# Patient Record
Sex: FEMALE | URBAN - METROPOLITAN AREA
[De-identification: names, ages, dates, MRNs, and addresses within clinical notes are randomized per-mention and may not be internally consistent; named-entity substitution may affect disease eponyms.]

---

## 2019-04-09 ENCOUNTER — APPOINTMENT (RX ONLY)
Dept: URBAN - METROPOLITAN AREA CLINIC 88 | Facility: CLINIC | Age: 29
Setting detail: DERMATOLOGY
End: 2019-04-09

## 2019-04-09 DIAGNOSIS — L40.0 PSORIASIS VULGARIS: ICD-10-CM

## 2019-04-09 DIAGNOSIS — L73.2 HIDRADENITIS SUPPURATIVA: ICD-10-CM

## 2019-04-09 PROCEDURE — ? TREATMENT REGIMEN

## 2019-04-09 PROCEDURE — ? ORDER TESTS

## 2019-04-09 PROCEDURE — 99202 OFFICE O/P NEW SF 15 MIN: CPT

## 2019-04-09 PROCEDURE — ? COUNSELING

## 2019-04-09 PROCEDURE — ? PRESCRIPTION

## 2019-04-09 RX ORDER — IXEKIZUMAB 80 MG/ML
INJECTION, SOLUTION SUBCUTANEOUS
Qty: 1 | Refills: 5 | Status: ERX

## 2019-04-09 ASSESSMENT — LOCATION ZONE DERM
LOCATION ZONE: AXILLAE
LOCATION ZONE: SCALP
LOCATION ZONE: ARM
LOCATION ZONE: LEG
LOCATION ZONE: EAR

## 2019-04-09 ASSESSMENT — LOCATION SIMPLE DESCRIPTION DERM
LOCATION SIMPLE: RIGHT EAR
LOCATION SIMPLE: LEFT AXILLARY VAULT
LOCATION SIMPLE: RIGHT PRETIBIAL REGION
LOCATION SIMPLE: POSTERIOR SCALP
LOCATION SIMPLE: LEFT EAR
LOCATION SIMPLE: RIGHT AXILLARY VAULT
LOCATION SIMPLE: LEFT ELBOW
LOCATION SIMPLE: RIGHT ELBOW
LOCATION SIMPLE: LEFT PRETIBIAL REGION

## 2019-04-09 ASSESSMENT — LOCATION DETAILED DESCRIPTION DERM
LOCATION DETAILED: RIGHT AXILLARY VAULT
LOCATION DETAILED: LEFT PROXIMAL PRETIBIAL REGION
LOCATION DETAILED: LEFT ELBOW
LOCATION DETAILED: POSTERIOR MID-PARIETAL SCALP
LOCATION DETAILED: RIGHT SCAPHA
LOCATION DETAILED: LEFT AXILLARY VAULT
LOCATION DETAILED: RIGHT ELBOW
LOCATION DETAILED: RIGHT PROXIMAL PRETIBIAL REGION
LOCATION DETAILED: LEFT SCAPHA

## 2019-04-09 NOTE — PROCEDURE: TREATMENT REGIMEN
Plan: Discussed and recommended a cx to r/o MRSA
Action 3: Continue
Continue Regimen: Clindamycin and Keflex that was given by urgent care
Detail Level: Zone
Continue Regimen: Lisette
Plan: Discussed and recommended pt see an immunologist to r/o any underlying diseases. Advised pt to start a healthier diet, add more green and leafy foods, avoid chicken if possible.

## 2019-05-28 ENCOUNTER — APPOINTMENT (RX ONLY)
Dept: URBAN - METROPOLITAN AREA CLINIC 88 | Facility: CLINIC | Age: 29
Setting detail: DERMATOLOGY
End: 2019-05-28

## 2019-05-28 DIAGNOSIS — L73.2 HIDRADENITIS SUPPURATIVA: ICD-10-CM | Status: STABLE

## 2019-05-28 DIAGNOSIS — L40.0 PSORIASIS VULGARIS: ICD-10-CM | Status: UNCHANGED

## 2019-05-28 PROBLEM — I10 ESSENTIAL (PRIMARY) HYPERTENSION: Status: ACTIVE | Noted: 2019-05-28

## 2019-05-28 PROCEDURE — ? TREATMENT REGIMEN

## 2019-05-28 PROCEDURE — 99213 OFFICE O/P EST LOW 20 MIN: CPT

## 2019-05-28 PROCEDURE — ? COUNSELING

## 2019-05-28 ASSESSMENT — LOCATION ZONE DERM
LOCATION ZONE: ARM
LOCATION ZONE: LEG
LOCATION ZONE: AXILLAE
LOCATION ZONE: SCALP
LOCATION ZONE: EAR

## 2019-05-28 ASSESSMENT — LOCATION DETAILED DESCRIPTION DERM
LOCATION DETAILED: LEFT PROXIMAL PRETIBIAL REGION
LOCATION DETAILED: LEFT AXILLARY VAULT
LOCATION DETAILED: RIGHT ELBOW
LOCATION DETAILED: RIGHT AXILLARY VAULT
LOCATION DETAILED: LEFT SCAPHA
LOCATION DETAILED: RIGHT SCAPHA
LOCATION DETAILED: POSTERIOR MID-PARIETAL SCALP
LOCATION DETAILED: LEFT ELBOW
LOCATION DETAILED: RIGHT PROXIMAL PRETIBIAL REGION

## 2019-05-28 ASSESSMENT — LOCATION SIMPLE DESCRIPTION DERM
LOCATION SIMPLE: LEFT PRETIBIAL REGION
LOCATION SIMPLE: RIGHT PRETIBIAL REGION
LOCATION SIMPLE: RIGHT EAR
LOCATION SIMPLE: LEFT AXILLARY VAULT
LOCATION SIMPLE: LEFT EAR
LOCATION SIMPLE: RIGHT ELBOW
LOCATION SIMPLE: RIGHT AXILLARY VAULT
LOCATION SIMPLE: LEFT ELBOW
LOCATION SIMPLE: POSTERIOR SCALP

## 2019-05-28 NOTE — PROCEDURE: TREATMENT REGIMEN
Plan: Discussed and recommended pt see an immunologist to r/o any underlying diseases. Advised pt to start a healthier diet, add more green and leafy foods, avoid chicken if possible.
Action 2: Continue
Detail Level: Zone
Continue Regimen: Taltz, we will consider Tremfya due to patients flare up, forms were completed as faxed to [facility]

## 2019-06-18 ENCOUNTER — RX ONLY (OUTPATIENT)
Age: 29
Setting detail: RX ONLY
End: 2019-06-18

## 2019-06-18 RX ORDER — CLOBETASOL PROPIONATE 0.5 MG/G
CREAM TOPICAL
Qty: 1 | Refills: 0 | Status: ERX | COMMUNITY
Start: 2019-06-18

## 2019-08-20 ENCOUNTER — APPOINTMENT (RX ONLY)
Dept: URBAN - METROPOLITAN AREA CLINIC 88 | Facility: CLINIC | Age: 29
Setting detail: DERMATOLOGY
End: 2019-08-20

## 2019-08-20 DIAGNOSIS — L40.0 PSORIASIS VULGARIS: ICD-10-CM

## 2019-08-20 DIAGNOSIS — D18.0 HEMANGIOMA: ICD-10-CM

## 2019-08-20 DIAGNOSIS — L73.2 HIDRADENITIS SUPPURATIVA: ICD-10-CM

## 2019-08-20 PROBLEM — D18.01 HEMANGIOMA OF SKIN AND SUBCUTANEOUS TISSUE: Status: ACTIVE | Noted: 2019-08-20

## 2019-08-20 PROCEDURE — ? PRESCRIPTION

## 2019-08-20 PROCEDURE — ? TREATMENT REGIMEN

## 2019-08-20 PROCEDURE — 99214 OFFICE O/P EST MOD 30 MIN: CPT

## 2019-08-20 PROCEDURE — ? COUNSELING

## 2019-08-20 RX ORDER — BETAMETHASONE DIPROPIONATE 0.5 MG/G
OINTMENT TOPICAL BID
Qty: 1 | Refills: 0 | Status: ERX | COMMUNITY
Start: 2019-08-20

## 2019-08-20 RX ORDER — SODIUM SULFACETAMIDE AND SULFUR 80; 40 MG/ML; MG/ML
SOLUTION TOPICAL QD
Qty: 1 | Refills: 3 | Status: ERX | COMMUNITY
Start: 2019-08-20

## 2019-08-20 RX ADMIN — SODIUM SULFACETAMIDE AND SULFUR: 80; 40 SOLUTION TOPICAL at 18:35

## 2019-08-20 RX ADMIN — BETAMETHASONE DIPROPIONATE: 0.5 OINTMENT TOPICAL at 18:32

## 2019-08-20 ASSESSMENT — LOCATION SIMPLE DESCRIPTION DERM
LOCATION SIMPLE: RIGHT POSTERIOR THIGH
LOCATION SIMPLE: RIGHT UPPER BACK
LOCATION SIMPLE: LEFT CALF
LOCATION SIMPLE: LEFT POSTERIOR THIGH
LOCATION SIMPLE: RIGHT PRETIBIAL REGION
LOCATION SIMPLE: LEFT AXILLARY VAULT
LOCATION SIMPLE: RIGHT SHOULDER
LOCATION SIMPLE: RIGHT CALF
LOCATION SIMPLE: LEFT PRETIBIAL REGION
LOCATION SIMPLE: LEFT FOREARM
LOCATION SIMPLE: RIGHT AXILLARY VAULT
LOCATION SIMPLE: RIGHT ELBOW
LOCATION SIMPLE: LEFT BREAST

## 2019-08-20 ASSESSMENT — LOCATION DETAILED DESCRIPTION DERM
LOCATION DETAILED: LEFT DISTAL POSTERIOR THIGH
LOCATION DETAILED: LEFT PROXIMAL CALF
LOCATION DETAILED: LEFT MEDIAL BREAST 9-10:00 REGION
LOCATION DETAILED: LEFT PROXIMAL PRETIBIAL REGION
LOCATION DETAILED: RIGHT PROXIMAL PRETIBIAL REGION
LOCATION DETAILED: RIGHT ANTERIOR SHOULDER
LOCATION DETAILED: RIGHT ELBOW
LOCATION DETAILED: RIGHT MEDIAL UPPER BACK
LOCATION DETAILED: LEFT PROXIMAL DORSAL FOREARM
LOCATION DETAILED: LEFT AXILLARY VAULT
LOCATION DETAILED: RIGHT PROXIMAL CALF
LOCATION DETAILED: RIGHT DISTAL LATERAL POSTERIOR THIGH
LOCATION DETAILED: RIGHT AXILLARY VAULT

## 2019-08-20 ASSESSMENT — LOCATION ZONE DERM
LOCATION ZONE: LEG
LOCATION ZONE: TRUNK
LOCATION ZONE: AXILLAE
LOCATION ZONE: ARM

## 2019-08-20 NOTE — PROCEDURE: TREATMENT REGIMEN
Action 3: Continue
Plan: Since pt reports Taltz is the only biologic that she can tolerate, recommend trying Taltz again. Patient is to use Betamethasone in the mean time. Pt also reports she has psoriatic arthritis. Recommended consult with rheumatologist, but pt reports she cannot find one that will take her insurance.
Detail Level: Zone

## 2019-09-12 ENCOUNTER — APPOINTMENT (RX ONLY)
Dept: URBAN - METROPOLITAN AREA CLINIC 88 | Facility: CLINIC | Age: 29
Setting detail: DERMATOLOGY
End: 2019-09-12

## 2019-09-12 DIAGNOSIS — L40.0 PSORIASIS VULGARIS: ICD-10-CM

## 2019-09-12 PROCEDURE — ? ORDER TESTS

## 2019-09-12 ASSESSMENT — PGA PSORIASIS: PGA PSORIASIS: MODERATE (MODERATE PLAQUE ELEVATION, MODERATE ERYTHEMA, COARSE SCALE PREDOMINATES)

## 2019-09-24 ENCOUNTER — APPOINTMENT (RX ONLY)
Dept: URBAN - METROPOLITAN AREA CLINIC 88 | Facility: CLINIC | Age: 29
Setting detail: DERMATOLOGY
End: 2019-09-24

## 2019-09-24 DIAGNOSIS — L40.0 PSORIASIS VULGARIS: ICD-10-CM

## 2019-09-24 PROCEDURE — ? TALTZ INITIATION

## 2019-09-24 PROCEDURE — 99213 OFFICE O/P EST LOW 20 MIN: CPT

## 2019-09-24 PROCEDURE — ? PRESCRIPTION

## 2019-09-24 RX ORDER — IXEKIZUMAB 80 MG/ML
INJECTION, SOLUTION SUBCUTANEOUS
Qty: 2 | Refills: 2 | Status: ERX

## 2019-09-24 NOTE — PROCEDURE: TALTZ INITIATION
Pregnancy And Lactation Warning Text: The risk during pregnancy and breastfeeding is uncertain with this medication.
Is Methotrexate Contraindicated?: No
Taltz Monitoring Guidelines: A yearly test for tuberculosis is required while taking Taltz.
Diagnosis (Required): Psoriasis
Taltz Dosing: 160mg SC x 1 at weeks 0 then 80mg SC at weeks 2, 4, 6, 8, 10 and 12 then 80mg SC every four weeks
Detail Level: Zone

## 2019-12-06 ENCOUNTER — APPOINTMENT (RX ONLY)
Dept: URBAN - METROPOLITAN AREA CLINIC 88 | Facility: CLINIC | Age: 29
Setting detail: DERMATOLOGY
End: 2019-12-06

## 2019-12-06 DIAGNOSIS — L40.0 PSORIASIS VULGARIS: ICD-10-CM

## 2019-12-06 PROCEDURE — 99214 OFFICE O/P EST MOD 30 MIN: CPT

## 2019-12-06 PROCEDURE — ? PRESCRIPTION

## 2019-12-06 PROCEDURE — ? ADDITIONAL NOTES

## 2019-12-06 RX ORDER — IXEKIZUMAB 80 MG/ML
INJECTION, SOLUTION SUBCUTANEOUS
Qty: 2 | Refills: 2 | Status: CANCELLED

## 2019-12-06 NOTE — PROCEDURE: ADDITIONAL NOTES
Detail Level: Simple
Additional Notes: Since patient continues to flare up on maintainance dose of taltz, rec to continue taltz injections q2 weeks.\\nWill start new PA.

## 2020-01-27 RX ORDER — IXEKIZUMAB 80 MG/ML
INJECTION, SOLUTION SUBCUTANEOUS
Qty: 2 | Refills: 2 | Status: ERX

## 2020-02-04 ENCOUNTER — APPOINTMENT (RX ONLY)
Dept: URBAN - METROPOLITAN AREA CLINIC 88 | Facility: CLINIC | Age: 30
Setting detail: DERMATOLOGY
End: 2020-02-04

## 2020-02-04 DIAGNOSIS — L73.2 HIDRADENITIS SUPPURATIVA: ICD-10-CM

## 2020-02-04 DIAGNOSIS — L40.0 PSORIASIS VULGARIS: ICD-10-CM

## 2020-02-04 PROCEDURE — ? ADDITIONAL NOTES

## 2020-02-04 PROCEDURE — ? PRESCRIPTION

## 2020-02-04 PROCEDURE — ? COUNSELING

## 2020-02-04 PROCEDURE — 99214 OFFICE O/P EST MOD 30 MIN: CPT

## 2020-02-04 ASSESSMENT — LOCATION SIMPLE DESCRIPTION DERM
LOCATION SIMPLE: RIGHT AXILLARY VAULT
LOCATION SIMPLE: LEFT AXILLARY VAULT
LOCATION SIMPLE: LEFT THIGH
LOCATION SIMPLE: RIGHT THIGH

## 2020-02-04 ASSESSMENT — LOCATION DETAILED DESCRIPTION DERM
LOCATION DETAILED: RIGHT AXILLARY VAULT
LOCATION DETAILED: RIGHT ANTERIOR PROXIMAL THIGH
LOCATION DETAILED: LEFT AXILLARY VAULT
LOCATION DETAILED: LEFT ANTERIOR PROXIMAL THIGH

## 2020-02-04 ASSESSMENT — LOCATION ZONE DERM
LOCATION ZONE: LEG
LOCATION ZONE: AXILLAE

## 2020-03-03 ENCOUNTER — APPOINTMENT (RX ONLY)
Dept: URBAN - METROPOLITAN AREA CLINIC 88 | Facility: CLINIC | Age: 30
Setting detail: DERMATOLOGY
End: 2020-03-03

## 2020-03-03 DIAGNOSIS — L73.2 HIDRADENITIS SUPPURATIVA: ICD-10-CM | Status: INADEQUATELY CONTROLLED

## 2020-03-03 DIAGNOSIS — L40.59 OTHER PSORIATIC ARTHROPATHY: ICD-10-CM | Status: INADEQUATELY CONTROLLED

## 2020-03-03 DIAGNOSIS — L40.0 PSORIASIS VULGARIS: ICD-10-CM | Status: INADEQUATELY CONTROLLED

## 2020-03-03 PROCEDURE — 99214 OFFICE O/P EST MOD 30 MIN: CPT

## 2020-03-03 PROCEDURE — ? COUNSELING

## 2020-03-03 PROCEDURE — ? HUMIRA INITIATION

## 2020-03-03 ASSESSMENT — LOCATION SIMPLE DESCRIPTION DERM
LOCATION SIMPLE: RIGHT THIGH
LOCATION SIMPLE: LEFT AXILLARY VAULT
LOCATION SIMPLE: LEFT THIGH
LOCATION SIMPLE: RIGHT AXILLARY VAULT

## 2020-03-03 ASSESSMENT — LOCATION ZONE DERM
LOCATION ZONE: RIB
LOCATION ZONE: LEG
LOCATION ZONE: AXILLAE

## 2020-03-03 ASSESSMENT — LOCATION DETAILED DESCRIPTION DERM
LOCATION DETAILED: LEFT AXILLARY VAULT
LOCATION DETAILED: LEFT ANTERIOR PROXIMAL THIGH
LOCATION DETAILED: RIGHT ANTERIOR PROXIMAL THIGH
LOCATION DETAILED: RIGHT AXILLARY VAULT

## 2020-03-03 NOTE — PROCEDURE: HUMIRA INITIATION
Humira Dosing: 160 mg SC day 1, 80 mg SC day 14, then 40 mg SC every other week starting on day 29
Is Cyclosporine Contraindicated?: No
Detail Level: Zone
Diagnosis (Required): Hidradenitis Suppurativa
Humira Monitoring Guidelines: A yearly test for tuberculosis is required while taking Humira.
Pregnancy And Lactation Warning Text: This medication is Pregnancy Category B and is considered safe during pregnancy. It is unknown if this medication is excreted in breast milk.

## 2020-04-02 ENCOUNTER — RX ONLY (OUTPATIENT)
Age: 30
Setting detail: RX ONLY
End: 2020-04-02

## 2020-04-02 RX ORDER — CLOBETASOL PROPIONATE 0.5 MG/G
OINTMENT TOPICAL
Qty: 1 | Refills: 1 | Status: ERX | COMMUNITY
Start: 2020-04-02

## 2020-04-03 ENCOUNTER — RX ONLY (OUTPATIENT)
Age: 30
Setting detail: RX ONLY
End: 2020-04-03

## 2020-04-03 RX ORDER — CLOBETASOL PROPIONATE 0.5 MG/G
OINTMENT TOPICAL
Qty: 1 | Refills: 1 | Status: ERX

## 2020-04-14 ENCOUNTER — APPOINTMENT (RX ONLY)
Dept: URBAN - METROPOLITAN AREA CLINIC 88 | Facility: CLINIC | Age: 30
Setting detail: DERMATOLOGY
End: 2020-04-14

## 2020-04-14 DIAGNOSIS — L40.0 PSORIASIS VULGARIS: ICD-10-CM

## 2020-04-14 DIAGNOSIS — L73.2 HIDRADENITIS SUPPURATIVA: ICD-10-CM

## 2020-04-14 PROCEDURE — ? COUNSELING

## 2020-04-14 PROCEDURE — ? ADDITIONAL NOTES

## 2020-04-14 PROCEDURE — ? HUMIRA INITIATION

## 2020-04-14 PROCEDURE — 99214 OFFICE O/P EST MOD 30 MIN: CPT | Mod: 95

## 2020-04-14 PROCEDURE — ? PRESCRIPTION

## 2020-04-14 NOTE — PROCEDURE: ADDITIONAL NOTES
Additional Notes: Last PPD 9/2019\\nHumira approved; Patient to fu with pharmacy to schedule delivery. \\nAdvised against loading dose due to COVID 19
Detail Level: Simple

## 2020-04-14 NOTE — PROCEDURE: HUMIRA INITIATION
Is Hydroxychloroquine Contraindicated?: No
Diagnosis (Required): Hidradenitis Suppurativa
Humira Monitoring Guidelines: A yearly test for tuberculosis is required while taking Humira.
Humira Dosing: 160 mg SC day 1, 80 mg SC day 14, then 40 mg SC every other week starting on day 29
Pregnancy And Lactation Warning Text: This medication is Pregnancy Category B and is considered safe during pregnancy. It is unknown if this medication is excreted in breast milk.
Detail Level: Zone

## 2020-05-12 ENCOUNTER — APPOINTMENT (RX ONLY)
Dept: URBAN - METROPOLITAN AREA CLINIC 88 | Facility: CLINIC | Age: 30
Setting detail: DERMATOLOGY
End: 2020-05-12

## 2020-05-12 DIAGNOSIS — L73.2 HIDRADENITIS SUPPURATIVA: ICD-10-CM

## 2020-05-12 DIAGNOSIS — L40.0 PSORIASIS VULGARIS: ICD-10-CM

## 2020-05-12 PROCEDURE — ? COUNSELING

## 2020-05-12 PROCEDURE — ? ADDITIONAL NOTES

## 2020-05-12 PROCEDURE — 99214 OFFICE O/P EST MOD 30 MIN: CPT | Mod: 95

## 2020-05-12 PROCEDURE — ? DEFER

## 2020-05-12 PROCEDURE — ? HUMIRA MONITORING

## 2020-05-12 NOTE — PROCEDURE: HUMIRA MONITORING
Comments: Called pharmacy today to see update and stated that they have to call us back to see why insurance denied humira. Pt is flaring up today however defer ILK treatment
Detail Level: Zone
Add High Risk Medication Management Associated Diagnosis?: No
Length Of Therapy: 4 months

## 2020-05-12 NOTE — PROCEDURE: ADDITIONAL NOTES
Detail Level: Simple
Additional Notes: Last PPD 9/2019\\nHumira approved; Patient to fu with pharmacy to schedule delivery. \\nAdvised against loading dose due to COVID 19

## 2020-05-12 NOTE — PROCEDURE: DEFER
Detail Level: Zone
Procedure To Be Performed At Next Visit: Intralesional Kenalog
Introduction Text (Please End With A Colon): The following procedure was deferred:

## 2020-05-29 ENCOUNTER — APPOINTMENT (RX ONLY)
Dept: URBAN - METROPOLITAN AREA CLINIC 88 | Facility: CLINIC | Age: 30
Setting detail: DERMATOLOGY
End: 2020-05-29

## 2020-05-29 DIAGNOSIS — L40.0 PSORIASIS VULGARIS: ICD-10-CM

## 2020-05-29 DIAGNOSIS — L73.2 HIDRADENITIS SUPPURATIVA: ICD-10-CM

## 2020-05-29 PROBLEM — L85.3 XEROSIS CUTIS: Status: ACTIVE | Noted: 2020-05-29

## 2020-05-29 PROCEDURE — ? COUNSELING

## 2020-05-29 PROCEDURE — ? INJECTION

## 2020-05-29 PROCEDURE — ? ADDITIONAL NOTES

## 2020-05-29 PROCEDURE — ? HUMIRA MONITORING

## 2020-05-29 PROCEDURE — 99213 OFFICE O/P EST LOW 20 MIN: CPT | Mod: 25

## 2020-05-29 PROCEDURE — 96372 THER/PROPH/DIAG INJ SC/IM: CPT

## 2020-05-29 PROCEDURE — ? TREATMENT REGIMEN

## 2020-05-29 ASSESSMENT — LOCATION DETAILED DESCRIPTION DERM: LOCATION DETAILED: RIGHT BUTTOCK

## 2020-05-29 ASSESSMENT — LOCATION SIMPLE DESCRIPTION DERM: LOCATION SIMPLE: RIGHT BUTTOCK

## 2020-05-29 ASSESSMENT — LOCATION ZONE DERM: LOCATION ZONE: TRUNK

## 2020-05-29 NOTE — PROCEDURE: INJECTION
Medication (1) And Associated J-Code Units: Triamcinolone acetonide, 10mg
Route: IM
Units: mg
Hide Second Medication?: No
Post-Care Instructions: I reviewed with the patient in detail post-care instructions. Patient understands to keep the injection sites clean and call the clinic if there is any redness, swelling or pain.
Bill J-Code: yes
Consent: The risks of the medication were reviewed with the patient.
Dose Administered (Numbers Only - Mg, G, Mcg, Units, Cc): 40
Treatment Number: 1
Dose Administered (Numbers Only - Mg, G, Mcg, Units, Cc): 0
Procedure Information: Please note that the numeric value listed in the Medication (1) and associated J-code units and Medication (2) and associated J-code units variables are j-code amounts and do not represent either the concentration or the total amount of the medications injected.  I strongly recommend selecting no to the Render J-code information in note question. This will allow your note to be more clear. If you are billing j-codes with your injection codes you need to document the total amount of the medication injected. This amount should match the j-code units. For example, if you are injecting Triamcinolone 40mg as an intramuscular injection you would select 40 for the dose field and mg for the units. This would allow you to document  with 4 units (40mg = 10mg x 4). The total volume is not used to calculate j-codes only the amount of the medication administered.
Detail Level: None

## 2020-05-29 NOTE — PROCEDURE: ADDITIONAL NOTES
Detail Level: Simple
Additional Notes: Last PPD 9/2019\\nHumira approved; Patient to fu with pharmacy to schedule delivery. \\nAdvised against loading dose due to COVID 19\\n\\nInjected 40mg SQ Friday 5/24/2020

## 2020-05-29 NOTE — PROCEDURE: HUMIRA MONITORING
Patient Reported Weight(Optional But Include Units): 225
Comments: Called pharmacy today to see update and stated that they have to call us back to see why insurance denied humira. Pt is flaring up today however defer ILK treatment
Length Of Therapy: 4 months
Add High Risk Medication Management Associated Diagnosis?: No
Detail Level: Zone

## 2020-06-05 ENCOUNTER — RX ONLY (OUTPATIENT)
Age: 30
Setting detail: RX ONLY
End: 2020-06-05

## 2020-08-04 ENCOUNTER — APPOINTMENT (RX ONLY)
Dept: URBAN - METROPOLITAN AREA CLINIC 88 | Facility: CLINIC | Age: 30
Setting detail: DERMATOLOGY
End: 2020-08-04

## 2022-09-01 ENCOUNTER — HOSPITAL ENCOUNTER (OUTPATIENT)
Dept: HOSPITAL 97 - ER | Age: 32
Setting detail: OBSERVATION
LOS: 1 days | Discharge: HOME | End: 2022-09-02
Attending: INTERNAL MEDICINE | Admitting: INTERNAL MEDICINE
Payer: COMMERCIAL

## 2022-09-01 VITALS — BODY MASS INDEX: 37.4 KG/M2

## 2022-09-01 DIAGNOSIS — E03.9: ICD-10-CM

## 2022-09-01 DIAGNOSIS — E28.2: ICD-10-CM

## 2022-09-01 DIAGNOSIS — I34.1: ICD-10-CM

## 2022-09-01 DIAGNOSIS — R07.9: ICD-10-CM

## 2022-09-01 DIAGNOSIS — R00.0: ICD-10-CM

## 2022-09-01 DIAGNOSIS — L40.50: ICD-10-CM

## 2022-09-01 DIAGNOSIS — I10: Primary | ICD-10-CM

## 2022-09-01 DIAGNOSIS — Z20.822: ICD-10-CM

## 2022-09-01 LAB
BUN BLD-MCNC: 7 MG/DL (ref 7–18)
GLUCOSE SERPLBLD-MCNC: 114 MG/DL (ref 74–106)
HCT VFR BLD CALC: 39.2 % (ref 36–45)
INR BLD: 1.04
LYMPHOCYTES # SPEC AUTO: 1.9 K/UL (ref 0.7–4.9)
MAGNESIUM SERPL-MCNC: 2.2 MG/DL (ref 1.8–2.4)
MCV RBC: 80.7 FL (ref 80–100)
NT-PROBNP SERPL-MCNC: 27 PG/ML (ref ?–125)
PMV BLD: 7.9 FL (ref 7.6–11.3)
POTASSIUM SERPL-SCNC: 4 MMOL/L (ref 3.5–5.1)
RBC # BLD: 4.85 M/UL (ref 3.86–4.86)
TROPONIN I SERPL HS-MCNC: 10.4 PG/ML (ref ?–58.9)

## 2022-09-01 PROCEDURE — 93005 ELECTROCARDIOGRAM TRACING: CPT

## 2022-09-01 PROCEDURE — 81025 URINE PREGNANCY TEST: CPT

## 2022-09-01 PROCEDURE — 36415 COLL VENOUS BLD VENIPUNCTURE: CPT

## 2022-09-01 PROCEDURE — 96374 THER/PROPH/DIAG INJ IV PUSH: CPT

## 2022-09-01 PROCEDURE — 84439 ASSAY OF FREE THYROXINE: CPT

## 2022-09-01 PROCEDURE — 85610 PROTHROMBIN TIME: CPT

## 2022-09-01 PROCEDURE — 87811 SARS-COV-2 COVID19 W/OPTIC: CPT

## 2022-09-01 PROCEDURE — 71045 X-RAY EXAM CHEST 1 VIEW: CPT

## 2022-09-01 PROCEDURE — 83735 ASSAY OF MAGNESIUM: CPT

## 2022-09-01 PROCEDURE — 96361 HYDRATE IV INFUSION ADD-ON: CPT

## 2022-09-01 PROCEDURE — 99285 EMERGENCY DEPT VISIT HI MDM: CPT

## 2022-09-01 PROCEDURE — 93306 TTE W/DOPPLER COMPLETE: CPT

## 2022-09-01 PROCEDURE — 84443 ASSAY THYROID STIM HORMONE: CPT

## 2022-09-01 PROCEDURE — 80053 COMPREHEN METABOLIC PANEL: CPT

## 2022-09-01 PROCEDURE — 85025 COMPLETE CBC W/AUTO DIFF WBC: CPT

## 2022-09-01 PROCEDURE — 84484 ASSAY OF TROPONIN QUANT: CPT

## 2022-09-01 PROCEDURE — 80048 BASIC METABOLIC PNL TOTAL CA: CPT

## 2022-09-01 PROCEDURE — 70450 CT HEAD/BRAIN W/O DYE: CPT

## 2022-09-01 PROCEDURE — 81003 URINALYSIS AUTO W/O SCOPE: CPT

## 2022-09-01 PROCEDURE — 80061 LIPID PANEL: CPT

## 2022-09-01 PROCEDURE — 80307 DRUG TEST PRSMV CHEM ANLYZR: CPT

## 2022-09-01 PROCEDURE — 83880 ASSAY OF NATRIURETIC PEPTIDE: CPT

## 2022-09-01 PROCEDURE — 84481 FREE ASSAY (FT-3): CPT

## 2022-09-01 PROCEDURE — 96375 TX/PRO/DX INJ NEW DRUG ADDON: CPT

## 2022-09-01 NOTE — RAD REPORT
EXAM DESCRIPTION:  RAD - Chest Single View - 9/1/2022 10:19 pm

 

CLINICAL HISTORY:  SOB

Chest pain.

 

COMPARISON:  No comparisons

 

FINDINGS:  Portable technique limits examination quality.

 

Mild bilateral pulmonary opacities are present which may represent pulmonary edema or a viral infecti
on. The heart is mildly prominent. No displaced fractures.

## 2022-09-01 NOTE — RAD REPORT
EXAM DESCRIPTION:  CT - Head Brain Wo Cont - 9/1/2022 10:23 pm

 

CLINICAL HISTORY:  htn

Headache, drowsiness, hypertension

 

COMPARISON:  No comparisons

 

TECHNIQUE:  All CT scans are performed using dose optimization technique as appropriate and may inclu
de automated exposure control or mA/KV adjustment according to patient size.

 

FINDINGS:  No intracranial hemorrhage, hydrocephalus or extra-axial fluid collection.No areas of brai
n edema or evidence of midline shift.

 

The paranasal sinuses and mastoids are clear. The calvarium is intact.

 

IMPRESSION:  No acute intracranial abnormality.

## 2022-09-02 VITALS — SYSTOLIC BLOOD PRESSURE: 147 MMHG | DIASTOLIC BLOOD PRESSURE: 92 MMHG

## 2022-09-02 VITALS — TEMPERATURE: 98.3 F

## 2022-09-02 VITALS — OXYGEN SATURATION: 96 %

## 2022-09-02 LAB
ALBUMIN SERPL BCP-MCNC: 3.2 G/DL (ref 3.4–5)
ALP SERPL-CCNC: 82 U/L (ref 45–117)
ALT SERPL W P-5'-P-CCNC: 16 U/L (ref 12–78)
AST SERPL W P-5'-P-CCNC: 18 U/L (ref 15–37)
BUN BLD-MCNC: 10 MG/DL (ref 7–18)
GLUCOSE SERPLBLD-MCNC: 108 MG/DL (ref 74–106)
HCT VFR BLD CALC: 36 % (ref 36–45)
HDLC SERPL-MCNC: 40 MG/DL (ref 40–60)
LDLC SERPL CALC-MCNC: 103 MG/DL (ref ?–130)
LYMPHOCYTES # SPEC AUTO: 2.6 K/UL (ref 0.7–4.9)
MAGNESIUM SERPL-MCNC: 2.2 MG/DL (ref 1.8–2.4)
MCV RBC: 80.7 FL (ref 80–100)
METHAMPHET UR QL SCN: NEGATIVE
PMV BLD: 7.8 FL (ref 7.6–11.3)
POTASSIUM SERPL-SCNC: 3.9 MMOL/L (ref 3.5–5.1)
RBC # BLD: 4.46 M/UL (ref 3.86–4.86)
SP GR UR: 1.01 (ref 1–1.03)
THC SERPL-MCNC: NEGATIVE NG/ML
TSH SERPL DL<=0.05 MIU/L-ACNC: 0.81 UIU/ML (ref 0.36–3.74)

## 2022-09-02 NOTE — CON
Date of Consultation:  09/02/2022



Admitted on 09/02/2022 to Dr. Gill's service.



Reason For Consultation:  Hypertensive crisis.



History Of Present Illness:  Ms. Moseley is 31, has a history of PCOS, mitral valve prolapse, hypert
ension, psoriasis, continued chest pressure, atypical chest pain.  Blood pressure is 213/109, blood p
ressure now is __________.  Vital signs are stable, in sinus rhythm and no complaint.  Denied any ayaz
sea, vomiting, diaphoresis, PND, orthopnea, pedal edema, palpitations, syncope.  CT of her head is ne
gative.  EKG is unremarkable.  The rest of her blood work is unremarkable.  Chest x-ray showed possib
le infection versus edema.  Echocardiogram is pending.



Review of Systems:

Negative.



Social History:  Negative.



Family History:  Noncontributory.



Medications At Home:  Detailed by Dr. Gill.



Allergies:  SHE IS ALLERGIC TO MANY MEDICATIONS INCLUDING PENICILLIN, SULFA, IODINE, BETA BLOCKERS, N
ORVASC, CIPRO, JANUMET, CYMBALTA, CELEXA, METFORMIN, PROTON PUMP INHIBITORS, HYDROCHLOROTHIAZIDE, AND
 INHALERS.



Physical Examination:

General:  Ms. Moseley is pleasant, no acute distress. 

Vital Signs:  Stable, afebrile, sinus rhythm HEENT:  Negative. 

Chest:  Clear to auscultation and percussion. 

Cardiac:  Revealed a regular rhythm and rate.  No murmurs, gallops, or rubs. 

Extremities:  Revealed no edema.



Diagnostic Data:  As stated earlier.



Impression And Plan:  Mitral valve prolapse, hypertension with severe chest pain and blood pressure. 
 I think her chest pain is related to her mitral valve prolapse and severe hypertension.  She present
s in a very difficult situation considering her many allergies to almost every classic hypertension d
rugs.  I discussed the case with Dr. Gill.  I think certainly this is diastolic, hydralazine or doxa
zosin or clonidine may be reasonable.  I will leave that up to him.  Echocardiogram is pending.  We w
ill see what that shows.





NB/MODL

DD:  09/02/2022 10:10:19Voice ID:  [id number]

DT:  09/02/2022 11:27:34Report ID:  [id number]

## 2022-09-02 NOTE — P.HP
Certification for Inpatient


Patient admitted to: Observation


With expected LOS: <2 Midnights


Patient will require the following post-hospital care: None


Practitioner: I am a practitioner with admitting privileges, knowledge of 

patient current condition, hospital course, and medical plan of care.


Services: Services provided to patient in accordance with Admission requirements

found in Title 42 Section 412.3 of the Code of Federal Regulations





<Matthew Lopez - Last Filed: 22 03:35>





Patient History


Date of Service: 22


Primary Care Provider: Ilan


Reason for admission: Hypertensive urgency, chest pain


History of Present Illness: 


31-year-old female with history of psoriasis/psoriatic arthritis, MVP, PCOS, 

hypertension presents emergency department for high blood pressure.  She reports

that she was taken off her amlodipine few weeks ago by her PCP she was having 

swelling of her tongue in her lower extremities and due to all of her allergies 

they were unable to select another medication to put her on she supposed to 

follow-up with a cardiologist but not until later this month.  Upon arrival to 

the emergency department patient's blood pressure was elevated around 213/109 

heart rate was around 130 she was also having some chest pressure during her ED 

stay, she was eventually treated with metoprolol as she stated is an allergy but

she reports that it causes worsening of her psoriasis.  Currently treated for 

swelling to metoprolol currently heart rate 98 blood pressure 147/92 thyroid 

panel is pending patient does have history of hypothyroidism.  ED provider 

wishes to admit under observation for hypertensive urgency, chest pain.








- Past Medical/Surgical History


-: PCOS


-: Psoriasis/psoriatic arthritis


-: Hypertension


-: Hypothyroidism


-: MVP


-: 


Psychosocial/ Personal History: Patient lives at home with her  and child





- Family History


  ** Mother


-: Hypertension, Diabetes





  ** Father


Notes: psoriasis





  ** Sister


-: Diabetes





- Social History


Smoking Status: Never smoker


Alcohol use: No


CD- Drugs: No


Caffeine use: Yes


Place of Residence: Home





<Matthew Lopez - Last Filed: 22 03:35>


Date of Service: 22





<Misael Gill - Last Filed: 22 07:45>





Review of Systems


10-point ROS is otherwise unremarkable


Respiratory: Shortness of Breath


Cardiovascular: Chest Pain, Palpitations





<Matthew Lopez - Last Filed: 22 03:35>





Physical Examination





- Physical Exam


General: Alert, In no apparent distress, Oriented x3


HEENT: Atraumatic, PERRLA, Mucous membr. moist/pink, EOMI, Sclerae nonicteric


Neck: Supple, 2+ carotid pulse no bruit, No LAD, Without JVD or thyroid 

abnormality


Respiratory: Clear to auscultation bilaterally, Normal air movement


Cardiovascular: Regular rate/rhythm, Normal S1 S2


Capillary refill: <2 Seconds


Gastrointestinal: Normal bowel sounds, No tenderness


Musculoskeletal: No tenderness


Integumentary: No rashes


Neurological: Normal speech, Normal strength at 5/5 x4 extr, Normal tone, Normal

affect





- Studies


Laboratory Data (last 24 hrs)





22 22:10: PT 11.5, INR 1.04


22 22:10: WBC 9.50, Hgb 13.2, Hct 39.2, Plt Count 294


22 22:10: Sodium 136, Potassium 4.0, BUN 7, Creatinine 0.64, Glucose 114 

H, Magnesium 2.2








<Matthew Lopez - Last Filed: 22 03:35>





Assessment and Plan





- Plan


Assessment:


Chest pain


Hypertensive urgency


Hx of MVP


Hypothyroidism


PCOS


Psoriasis/Psoriatic arthritis





Plan:


Chest pain: Monitor on telemetry, trend troponins, cardiology consult in place 

and echocardiogram ordered.  Patient with history of mitral valve prolapse was 

experiencing tachycardia and hypertension as well during her ED stay.  Initiated

therapy with beta-blocker, aspirin patient with multiple drug allergies/adverse 

reactions.  Appreciate further input from cardiology.


Hypertensive urgency: Continue metoprolol, cardiology consult in place and 

echocardiogram ordered.  Patient has been on 14th of blood pressure medications 

reportedly with varying adverse reactions and allergic reactions, she reports 

that metoprolol can make her psoriasis worse this was the medication that was 

given to her in the emergency department which seems to work at this time.  We 

will continue his medication for the time being await further evaluation from 

cardiology.


Hx of MVP: Continue as above, echocardiogram ordered.


Hypothyroidism: Thyroid panel ordered and pending.  Continue home medications 

once verified.


PCOS: Stable continue home meds


Psoriasis/Psoriatic arthritis:Stable continue home meds





DVT PPX: lovenox


Code status:Full 


Discharge Plan: Home


Plan to discharge in: 24 Hours





- Advance Directives


Does patient have a Living Will: No


Does patient have a Durable POA for Healthcare: No





- Code Status/Comfort Care


Code Status Assessed: Yes (Full code)


Critical Care: No


Time Spent Managing Pts Care (In Minutes): 55





<Matthew Lopez - Last Filed: 22 03:35>


Physician Review: Patient Assessed, Agree with Above Assessment and Plan





<Misael Gill - Last Filed: 22 07:45>

## 2022-09-02 NOTE — ER
Nurse's Notes                                                                                     

 [facility]                                                                 

Name: Kim Moseley                                                                              

Age: 31 yrs                                                                                       

Sex: Female                                                                                       

: 1990                                                                                   

MRN: [id number]                                                                                   

Arrival Date: 2022                                                                          

Time: 20:59                                                                                       

Account#: [id number]                                                                            

Bed 6                                                                                             

Private MD:                                                                                       

Diagnosis: Chest pain, unspecified;Hypertensive urgency                                           

                                                                                                  

Presentation:                                                                                     

                                                                                             

21:31 Chief complaint: Left sided chest tightness, SOB, and high home BP today. Hx of HTN but hb  

      not medicated. Coronavirus screen: Client presents with at least one sign or symptom        

      that may indicate coronavirus-19. Standard/surgical mask placed on the client. Ebola        

      Screen: No symptoms or risks identified at this time. Risk Assessment: Do you want to       

      hurt yourself or someone else? Patient reports no desire to harm self or others. Onset      

      of symptoms was 2022.                                                         

21:31 Method Of Arrival: Ambulatory                                                           hb  

21:31 Acuity: ERIKA 2                                                                           hb  

                                                                                                  

Historical:                                                                                       

- Allergies:                                                                                      

21:32 PENICILLINS;                                                                            hb  

21:32 Bactrim;                                                                                hb  

21:32 Beta-Blockers (Beta-Adrenergic Blocking Agts);                                          hb  

21:32 amlodipine;                                                                             hb  

21:32 proparacaine;                                                                           hb  

21:32 IV Contrast;                                                                            hb  

21:32 HC2 Inhibitors;                                                                         hb  

21:32 Doxycycline;                                                                            hb  

21:32 Proton Pump Inhibitors;                                                                 hb  

21:32 Cipro;                                                                                  hb  

21:32 Janumet;                                                                                hb  

21:32 metformin;                                                                              hb  

21:32 Hydrochlorothiazide;                                                                    hb  

21:32 Cyclosporine;                                                                           hb  

21:32 Celexa;                                                                                 hb  

21:32 Cymbalta;                                                                               hb  

21:32 Geodon;                                                                                 hb  

21:32 lithium carbonate;                                                                      hb  

21:32 Losartan;                                                                               hb  

21:32 Zofran;                                                                                 hb  

21:32 Cosentyx;                                                                               hb  

21:32 Tremfya;                                                                                hb  

21:32 Morphine;                                                                               hb  

21:32 Fentanyl;                                                                               hb  

21:32 Breo Ellipta;                                                                           hb  

21:32 Latex, Natural Rubber;                                                                  hb  

21:32 spironolactone;                                                                         hb  

21:32 ELIDIA 28;                                                                                 hb  

21:32 Gildess;                                                                                hb  

                                                                                                  

- Immunization history:: Adult Immunizations up to date.                                          

- Social history:: Smoking status: Patient denies any tobacco usage or history of.                

                                                                                                  

                                                                                                  

Screenin:45 Abuse screen: Denies threats or abuse. Nutritional screening: No deficits noted.        jb4 

      Tuberculosis screening: No symptoms or risk factors identified. Fall Risk None              

      identified.                                                                                 

                                                                                                  

Assessment:                                                                                       

21:45 General: Appears in no apparent distress. uncomfortable, Behavior is calm, cooperative, jb4 

      appropriate for age. Pain: Complains of pain in chest Pain does not radiate. Pain           

      currently is 2 out of 10 on a pain scale. Neuro: Level of Consciousness is awake,           

      alert, obeys commands, Oriented to person, place, time, situation. Cardiovascular:          

      Patient's skin is warm and dry. Respiratory: Airway is patent Respiratory effort is         

      even, unlabored, Respiratory pattern is regular, symmetrical, Breath sounds are clear       

      bilaterally. GI: No signs and/or symptoms were reported involving the gastrointestinal      

      system. : No signs and/or symptoms were reported regarding the genitourinary system.      

      EENT: No signs and/or symptoms were reported regarding the EENT system. Derm: Skin is       

      intact, Skin is pink, warm \T\ dry. Musculoskeletal: Circulation, motion, and sensation     

      intact. Range of motion: intact in all extremities.                                         

22:37 Reassessment: Pt reports having a history of Mitral valve prolapse, and voiced concern  jb4 

      for taking Nitro. Provider notified, informed this nurse that nitro is safe to give and     

      to still give 1L bolus of NS. Pt notified.                                                  

22:45 Reassessment: Pt given sublingual nitro and transdermal. Within approximately 30seconds jb4 

      pt reports that her mouth was burning and tingling but the transdermal had no adverse       

      effect, immediatley after pt began to report severe headache, chest, abdomen, and body      

      cramping. Pt spit out remaining oral nitro and transdermal was removed, provider            

      notified and at the bedside. Received verbal order to give 50mg of Benadryl IV.             

                                                                                             

00:00 Reassessment: Patient appears in no apparent distress at this time. Patient and/or      jb4 

      family updated on plan of care and expected duration. Pain level reassessed. Patient is     

      alert, oriented x 3, equal unlabored respirations, skin warm/dry/pink. Patient states       

      feeling better. Patient states symptoms have improved.                                      

01:30 Reassessment: Patient appears in no apparent distress at this time. Patient and/or      jb4 

      family updated on plan of care and expected duration. Pain level reassessed. Patient is     

      alert, oriented x 3, equal unlabored respirations, skin warm/dry/pink.                      

01:40 Reassessment: Informed pt that provider would like to use Metoprolol to treat her       jb4 

      tachycardia. Informed pt Metoprolol is a beta blocker. Pt states " I have never had         

      Metoprolol before, and the other incidents were years ago. I want to take it. If I need     

      to later on, I will follow up with my dermatologist to have my psoriasis taken care         

      of." Provider informed of conversation with pt.                                             

03:16 Reassessment: Patient appears in no apparent distress at this time. Patient and/or      jb4 

      family updated on plan of care and expected duration. Pain level reassessed. Patient is     

      alert, oriented x 3, equal unlabored respirations, skin warm/dry/pink. Pt is resting        

      comfortably in bed with family at the bedside. reports feeling much better. Patient         

      states feeling better. Patient states symptoms have improved.                               

                                                                                                  

Vital Signs:                                                                                      

                                                                                             

21:31  / 109; Pulse 129; Resp 20; Temp 98.3; Pulse Ox 97% on R/A; Weight 102.06 kg;     hb  

      Height 5 ft. 5 in. (165.10 cm); Pain 2/10;                                                  

22:57  / 120; Pulse 120; Resp 21; Pulse Ox 99% on R/A;                                  jb4 

                                                                                             

00:36  / 108; Pulse 135; Resp 28; Pulse Ox 98% on R/A;                                  jb4 

01:30  / 96; Pulse 150; Resp 18; Pulse Ox 98% on R/A;                                   jb4 

02:30  / 89; Pulse 105; Resp 14; Pulse Ox 96% on R/A;                                   jb4 

03:30  / 92; Pulse 98; Resp 16; Pulse Ox 96% on R/A;                                    jb4 

                                                                                             

21:31 Body Mass Index 37.44 (102.06 kg, 165.10 cm)                                            hb  

01:30 Provider notified of Heart rate, see MAR for orders.                                    jb4 

                                                                                                  

ED Course:                                                                                        

                                                                                             

20:59 Patient arrived in ED.                                                                  ja2 

21:32 Triage completed.                                                                       hb  

21:41 Nelson Coronado, RN is Primary Nurse.                                                     jb4 

21:45 Patient has correct armband on for positive identification. Placed in gown. Bed in low  jb4 

      position. Call light in reach. Side rails up X 1. Client placed on continuous cardiac       

      and pulse oximetry monitoring. NIBP monitoring applied. Cardiac monitor on.                 

21:53 Venkata Gordon PA is PHCP.                                                                cp  

21:53 Venkata Villa MD is Attending Physician.                                             cp  

22:00 Inserted saline lock: 18 gauge in right antecubital area, using aseptic technique.      jb4 

      Blood collected.                                                                            

22:20 Troponin HS Sent.                                                                       jb4 

22:20 NT PRO-BNP Sent.                                                                        jb4 

22:20 Magnesium Sent.                                                                         jb4 

22:20 CBC with Diff Sent.                                                                     jb4 

22:20 Basic Metabolic Panel Sent.                                                             jb4 

22:21 XRAY Chest (1 view) In Process Unspecified.                                             EDMS

22:24 CT Head Brain wo Cont In Process Unspecified.                                           EDMS

                                                                                             

03:12 Gabino Jo MD is Hospitalizing Provider.                                           cp  

03:15 No provider procedures requiring assistance completed.                                  jb4 

03:15 Patient admitted, IV remains in place.                                                  jb4 

                                                                                                  

Administered Medications:                                                                         

                                                                                             

22:45 Drug: NS 0.9% 1000 ml Route: IV; Rate: 1 bolus; Site: right antecubital;                jb4 

                                                                                             

00:00 Follow up: Response: No adverse reaction; IV Status: Completed infusion; IV Intake:     jb4 

      1000ml                                                                                      

                                                                                             

22:45 Drug: Nitro-Bid (nitroglycerin) Ointment 2 % 0.5 inches Route: Transdermal; Site:       [facility], [place] 

      anterior chest wall;                                                                        

22:45 Follow up: Response: Adverse reaction, Physician notified; see chart                    jb4 

22:45 Drug: Nitroglycerin 0.4 mg Route: Sublingual;                                           jb4 

22:45 Follow up: Response: Adverse reaction, Physician notified; see chart                    jb4 

22:50 Drug: Benadryl (diphenhydrAMINE) 50 mg Route: IVP; Site: right antecubital;             jb4 

23:15 Follow up: Response: No adverse reaction; Marked relief of symptoms                     jb4 

23:52 Not Given (not availablee): Methyldopa 500 mg PO once                                     

                                                                                             

00:36 Drug: Doxazosin 4 mg Route: PO;                                                         jb4 

01:15 Follow up: Response: No adverse reaction; Marked relief of symptoms                     jb4 

01:45 Drug: Lopressor (metoprolol) 5 mg Route: IVP; Site: right antecubital;                  jb4 

02:15 Follow up: Response: No adverse reaction; Marked relief of symptoms                     jb4 

02:04 Drug: Metoprolol TARTRATE 50 mg Route: PO;                                              jb4 

02:30 Follow up: Response: No adverse reaction; Marked relief of symptoms                     jb4 

02:04 Drug: Aspirin 325 mg Route: PO;                                                         jb4 

02:30 Follow up: Response: No adverse reaction                                                jb4 

                                                                                                  

                                                                                                  

Medication:                                                                                       

03:15 VIS not applicable for this client.                                                     jb4 

                                                                                                  

Intake:                                                                                           

00:00 IV: 1000ml; Total: 1000ml.                                                              jb4 

                                                                                                  

Outcome:                                                                                          

03:16 Decision to Hospitalize by Provider.                                                    cp  

03:30 Admitted to ER Hold.  Please see [facility] for further documentation.                    jb4 

03:30 Condition: stable                                                                           

03:30 Discharge instructions given to patient, Instructed on the need for admit, Demonstrated     

      understanding of instructions.                                                              

15:49 Patient left the ED.                                                                    ll1 

                                                                                                  

Signatures:                                                                                       

Dispatcher [employer]                           EDMS                                                 

Venkata Gordon PA PA   cp                                                   

Renee Queen RN RN                                                      

Nelson Coronado RN RN   jb4                                                  

Prosper Sanders RN RN   ll1                                                  

Valerie Black                                                  

                                                                                                  

Corrections: (The following items were deleted from the chart)                                    

                                                                                             

21:38 21:32 PMHx: Hypothyroidism; hb                                                          hb  

22:57 22:37 Reassessment: Pt reports having a history of Mitral valve prolapse, and voiced    jb4 

      concern for taking Nitro. Provider notified, informed this nurse that nitro is safe to      

      give and to still give 1L bolus of NS. jb4                                                  

                                                                                             

03:43 02:30  / 92; Pulse 66bpm; Resp 16bpm; Pulse Ox 97% RA; jb4                        4 

03:43 03:30  / 88; Pulse 68bpm; Resp 12bpm; Pulse Ox 95% RA; jb4                        jb4 

06:52 02:00 Reassessment: Informed pt that provider would like to use Metoprolol to treat her jb4 

      tachycardia. Informed pt Metoprolol is a beta blocker. Pt states " I have never had         

      Metoprolol before, and the other incidents were years ago. I want to take it. If I need     

      to later on, I will follow up with my dermatologist to have my psoriasis taken care         

      of." Provider informed of conversation with pt. jb4                                         

                                                                                                  

**************************************************************************************************

## 2022-09-02 NOTE — P.DS
Admission Date: 09/02/22


Discharge Date: 09/02/22


Primary Care Provider: Ilan


Disposition: ROUTINE DISCHARGE


Discharge Condition: GOOD


Reason for Admission: Hypertensive urgency, chest pain


Consultations: 


1. Cardiology


Hospital Course: 





DIAGNOSES:


# Hypertensive Urgency


# Polycystic Ovarian Cystic Syndrome


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


# Mitral Valve Prolapse


# Hypothyroidism





HOSPITAL COURSE:


Ms. Kmi Moseley is a 31 year old female with a past medical history 

significant for hypertension, polycystic ovarian cystic syndrome, psoriasis 

complicated by psoriatic arthritis, and hypothyroidism who was admitted to the 

[facility] on 09/02/2022 for hypertension.





Upon presentation, her blood pressure was elevated to 213/109 and she was 

tachycardic. She was admitted to the Medicine service for further evaluation. 

Cardiology was consulted and she was evaluated by Dr. Mueller. He recommended 

that she be started on metoprolol, and she had significant improvement in her 

blood pressures. A CT head revealed, "no acute intracranial abnormality." Her 

transthoracic echocardiogram was performed, which revealed, "normal left 

ventricular ejection fraction 55-60%. normal wall motion. mild mitral 

regurgitation." Dr. Mueller has cleared her for discharge home with metoprolol 

and a outpatient Cardiology follow-up.  





On 09/02/2022, she was seen on rounds and deemed medically stable for discharge.

She was discharged with instructions to schedule follow-up appointments with her

PCP in 3-5 days and with Cardiology (Dr. Mueller) in 5-7 days. She was provided 

prescriptions for metoprolol. She was given the opportunity to ask questions and

reported no further questions. Furthermore, all questions were answered to the 

best of my ability.





Today, I personally spent 25 minutes on her case, of which greater than 50% of 

the time was spent in patient education, counseling, and coordination of care as

described above.








Vital Signs/Physical Exam: 














Temp Pulse Resp BP Pulse Ox


 


 97.7 F   95 H  17   152/90 H  98 


 


 09/02/22 12:00  09/02/22 12:00  09/02/22 12:00  09/02/22 12:00  09/02/22 12:00








General: Alert, In no apparent distress, Oriented x3


HEENT: Atraumatic, PERRLA, Mucous membr. moist/pink, EOMI, Sclerae nonicteric


Neck: Supple, JVD not distended


Respiratory: Clear to auscultation bilaterally, Normal air movement


Cardiovascular: No edema, Regular rate/rhythm, Normal S1 S2, No gallops, No 

rubs, No murmurs


Gastrointestinal: Normal bowel sounds, Soft and benign, Non-distended, No 

tenderness, No rebound, No guarding


Musculoskeletal: No clubbing


Integumentary: No rashes


Neurological: Normal speech, Cranial nerves 3-12 intact, Normal affect


Laboratory Data at Discharge: 














WBC  8.50 K/uL (4.3-10.9)   09/02/22  06:40    


 


Hgb  12.3 g/dL (12.0-15.0)   09/02/22  06:40    


 


Hct  36.0 % (36.0-45.0)   09/02/22  06:40    


 


Plt Count  271 K/uL (152-406)   09/02/22  06:40    


 


PT  11.5 SECONDS (9.5-12.5)   09/01/22  22:10    


 


INR  1.04   09/01/22  22:10    


 


Sodium  140 mmol/L (136-145)   09/02/22  06:40    


 


Potassium  3.9 mmol/L (3.5-5.1)   09/02/22  06:40    


 


BUN  10 mg/dL (7-18)   09/02/22  06:40    


 


Creatinine  0.56 mg/dL (0.55-1.3)   09/02/22  06:40    


 


Glucose  108 mg/dL ()  H  09/02/22  06:40    


 


Magnesium  2.2 mg/dL (1.8-2.4)   09/02/22  06:40    


 


Total Bilirubin  0.3 mg/dL (0.2-1.0)   09/02/22  06:40    


 


AST  18 U/L (15-37)   09/02/22  06:40    


 


ALT  16 U/L (12-78)   09/02/22  06:40    


 


Alkaline Phosphatase  82 U/L ()   09/02/22  06:40    


 


Triglycerides  202 mg/dL (<150)  H  09/02/22  06:40    


 


Cholesterol  183 mg/dL (<200)   09/02/22  06:40    


 


HDL Cholesterol  40 mg/dL (40-60)   09/02/22  06:40    


 


Cholesterol/HDL Ratio  4.58   09/02/22  06:40    








Home Medications: 








Metoprolol Tartrate [Lopressor*] 50 mg PO BID #60 tab 09/02/22 





New Medications: 


Metoprolol Tartrate [Lopressor*] 50 mg PO BID #60 tab


Physician Discharge Instructions: 


1. Please schedule a follow-up with your PCP ([facility]) in 3-5 

days


2. Please schedule a follow-up with Cardiology (Dr. Mueller) in 5-7 days





- Please check your blood pressure twice per day and leave a diary to bring to 

this appointment


Diet: Low sodium


Activity: Ad adalberto


Followup: 


NONE,NONE [Primary Care Provider] - 


Maverick Mueller MD [ACTIVE - CAN ADMIT] - 


Time spent managing pt's care (in minutes): 25

## 2022-09-02 NOTE — EDPHYS
Physician Documentation                                                                           

 [facility]                                                                 

Name: Kim Moseley                                                                              

Age: 31 yrs                                                                                       

Sex: Female                                                                                       

: 1990                                                                                   

MRN: [id number]                                                                                   

Arrival Date: 2022                                                                          

Time: 20:59                                                                                       

Account#: [id number]                                                                            

Bed 6                                                                                             

Private MD:                                                                                       

[facility] Physician Venkata Villa                                                                      

HPI:                                                                                              

                                                                                             

22:05 This 31 yrs old Female presents to ER via Ambulatory with complaints of High Blood      cp  

      Pressure.                                                                                   

22:05 The patient has elevated blood pressure and discovered this at home, with a home device.cp  

22:05 Associated signs and symptoms: Pertinent positives: chest pain, headache, shortness of  cp  

      breath. Severity of symptoms: in the emergency department the blood pressure is             

      unchanged. Patient reports history of htn. Has been off blood pressure medication for       

      past 3 months. Has been on amlodipine in the past but had anaphylactic reaction.            

      Reports allergic reaction to Lisinopril medication, diuretic blood pressure medication,     

      anaphylactic reaction to clonidine and allergy to hydralazine. Patient reports beta         

      blocker medication caused psoriasis to worsen with pustules forming after taking            

      medication over time.                                                                       

                                                                                                  

Historical:                                                                                       

- Allergies:                                                                                      

21:32 PENICILLINS;                                                                            hb  

21:32 Bactrim;                                                                                hb  

21:32 Beta-Blockers (Beta-Adrenergic Blocking Agts);                                          hb  

21:32 amlodipine;                                                                             hb  

21:32 proparacaine;                                                                           hb  

21:32 IV Contrast;                                                                            hb  

21:32 HC2 Inhibitors;                                                                         hb  

21:32 Doxycycline;                                                                            hb  

21:32 Proton Pump Inhibitors;                                                                 hb  

21:32 Cipro;                                                                                  hb  

21:32 Janumet;                                                                                hb  

21:32 metformin;                                                                              hb  

21:32 Hydrochlorothiazide;                                                                    hb  

21:32 Cyclosporine;                                                                           hb  

21:32 Celexa;                                                                                 hb  

21:32 Cymbalta;                                                                               hb  

21:32 Geodon;                                                                                 hb  

21:32 lithium carbonate;                                                                      hb  

21:32 Losartan;                                                                               hb  

21:32 Zofran;                                                                                 hb  

21:32 Cosentyx;                                                                               hb  

21:32 Tremfya;                                                                                hb  

21:32 Morphine;                                                                               hb  

21:32 Fentanyl;                                                                               hb  

21:32 Breo Ellipta;                                                                           hb  

21:32 Latex, Natural Rubber;                                                                  hb  

21:32 spironolactone;                                                                         hb  

21:32 ELIDIA 28;                                                                                 hb  

21:32 Gildess;                                                                                hb  

                                                                                                  

- Immunization history:: Adult Immunizations up to date.                                          

- Social history:: Smoking status: Patient denies any tobacco usage or history of.                

                                                                                                  

                                                                                                  

ROS:                                                                                              

22:10 Constitutional: Negative for body aches, chills, fever, poor PO intake.                 cp  

22:10 Eyes: Negative for injury, pain, redness, and discharge.                                cp  

22:10 Cardiovascular: Positive for chest pain, palpitations.                                  cp  

22:10 Respiratory: Positive for shortness of breath, Negative for cough, wheezing.            cp  

22:10 Abdomen/GI: Negative for abdominal pain, vomiting, diarrhea, constipation.                  

22:10 Back: Negative for pain at rest, pain with movement.                                        

22:10 : Negative for urinary symptoms.                                                          

                                                                                                  

Exam:                                                                                             

22:05 ECG was reviewed by the Attending Physician.                                            cp  

22:12 Constitutional: The patient appears in no acute distress, alert, awake,                 cp  

      non-diaphoretic, non-toxic, well developed, well nourished, obese, uncomfortable.           

22:12 Head/Face:  Normocephalic, atraumatic.                                                  cp  

22:12 Eyes: Periorbital structures: appear normal, Pupils: equal, round, and reactive to          

      light and accomodation, Extraocular movements: intact throughout, Conjunctiva: normal,      

      no exudate, no injection, Sclera: no appreciated abnormality, Lids and lashes: appear       

      normal, bilaterally.                                                                        

22:12 ENT: External ear(s): are unremarkable, Nose: is normal, Mouth: Lips: moist, Oral           

      mucosa: moist, Posterior pharynx: Airway: no evidence of obstruction, patent.               

22:12 Neck: ROM/movement: is normal, is supple, without pain, no range of motions                 

      limitations, no nuchal rigidity.                                                            

22:12 Chest/axilla: Inspection: normal.                                                           

22:12 Cardiovascular: Rate: tachycardic, Rhythm: regular, Edema: is not appreciated, JVD: is      

      not appreciated.                                                                            

22:12 Respiratory: the patient does not display signs of respiratory distress,  Respirations:     

      normal, no use of accessory muscles, no retractions, labored breathing, is not present,     

      Breath sounds: are clear throughout, no decreased breath sounds, no stridor, no             

      wheezing.                                                                                   

22:12 Abdomen/GI: Inspection: obese Bowel sounds: active, all quadrants, Palpation: abdomen       

      is soft and non-tender, in all quadrants.                                                   

22:12 Back: pain, is absent, ROM is normal.                                                       

22:12 Skin: consistent with  psoriasis, on the left lower leg.                                    

22:12 Neuro: Orientation: to person, place \T\ time. Mentation: is normal, Cerebellar function:   

      is grossly normal, Motor: moves all fours, strength is normal, Sensation: is normal.        

                                                                                             

01:37 ECG was reviewed by the Attending Physician.                                              

                                                                                                  

Vital Signs:                                                                                      

                                                                                             

21:31  / 109; Pulse 129; Resp 20; Temp 98.3; Pulse Ox 97% on R/A; Weight 102.06 kg;     hb  

      Height 5 ft. 5 in. (165.10 cm); Pain 2/10;                                                  

22:57  / 120; Pulse 120; Resp 21; Pulse Ox 99% on R/A;                                  jb4 

                                                                                             

00:36  / 108; Pulse 135; Resp 28; Pulse Ox 98% on R/A;                                  jb4 

01:30  / 96; Pulse 150; Resp 18; Pulse Ox 98% on R/A;                                   jb4 

02:30  / 89; Pulse 105; Resp 14; Pulse Ox 96% on R/A;                                   jb4 

03:30  / 92; Pulse 98; Resp 16; Pulse Ox 96% on R/A;                                    4 

                                                                                             

21:31 Body Mass Index 37.44 (102.06 kg, 165.10 cm)                                            hb  

01:30 Provider notified of Heart rate, see MAR for orders.                                    [facility] 

                                                                                                  

MDM:                                                                                              

                                                                                             

21:53 Patient medically screened.                                                             [facility] 

                                                                                             

03:15 Data reviewed: vital signs, nurses notes, lab test result(s), EKG, radiologic studies,  cp  

      CT scan, plain films, I have discussed the patient's presentation/case with the             

      attending Emergency Department Physician;.                                                  

03:15 Test interpretation: by ED physician or midlevel provider: ECG, plain radiologic        cp  

      studies. Response to treatment: the patient's symptoms have markedly improved after         

      treatment. Physician consultation: Matthew Lopez was contacted at 03:10, regarding             

      admission, to the telemetry unit. patient's condition, and will see patient in ED,          

      shortly.                                                                                    

                                                                                                  

                                                                                             

22:00 Order name: Basic Metabolic Panel; Complete Time: 23:00                                   

                                                                                             

23:00 Interpretation: Normal except: GLUC 114.                                                  

                                                                                             

22:00 Order name: CBC with Diff; Complete Time: 23:00                                           

                                                                                             

02:49 Interpretation: Reviewed.                                                                 

                                                                                             

22:00 Order name: Magnesium; Complete Time: 23:00                                             cp  

                                                                                             

22:00 Order name: NT PRO-BNP; Complete Time: 23:00                                            cp  

                                                                                             

02:50 Interpretation: NT PRO-BNP 27; Reviewed.                                                cp  

                                                                                             

22:00 Order name: PT-INR; Complete Time: 23:00                                                cp  

                                                                                             

22:00 Order name: Troponin HS; Complete Time: 23:00                                           cp  

                                                                                             

02:49 Interpretation: Troponin HS 10.4; Reviewed.                                             cp  

                                                                                             

01:16 Order name: UDS; Complete Time: 02:48                                                   la1 

                                                                                             

02:49 Interpretation: Reviewed.                                                               cp  

                                                                                             

01:19 Order name: TSH                                                                         cp  

                                                                                             

01:19 Order name: T3 Free                                                                     cp  

                                                                                             

01:21 Order name: T4 Free                                                                     la1 

                                                                                             

01:59 Order name: Urine Dipstick-Ancillary; Complete Time: 02:48                              EDMS

                                                                                             

02:48 Interpretation: Normal except: UBLD 1+; UPROT Trace; UESTR 1+.                          cp  

                                                                                             

02:01 Order name: Urine Pregnancy--Ancillary (enter results); Complete Time: 02:48              

                                                                                             

02:49 Interpretation: Reviewed.                                                               cp  

                                                                                             

03:11 Order name: Troponin HS                                                                 cp  

                                                                                             

07:07 Order name: CBC with Automated Diff                                                     EDMS

                                                                                             

22:00 Order name: XRAY Chest (1 view); Complete Time: 23:00                                   cp  

                                                                                             

22:02 Order name: CT Head Brain wo Cont; Complete Time: 23:00                                 cp  

                                                                                             

07:22 Order name: SARS RAPID                                                                  eb  

                                                                                             

07:31 Order name: Comprehensive Metabolic Panel                                               EDMS

                                                                                             

07:31 Order name: Lipid Profile                                                               EDMS

                                                                                             

07:31 Order name: Magnesium                                                                   EDMS

                                                                                             

10:18 Order name: SARS-COV-2 Antigen Rapid                                                    EDMS

                                                                                             

14:17 Order name: Troponin High Sensitivity                                                   EDMS

                                                                                             

22:00 Order name: EKG; Complete Time: 22:02                                                   cp  

                                                                                             

22:00 Order name: Cardiac monitoring; Complete Time: 22:07                                    cp  

                                                                                             

22:00 Order name: EKG - Nurse/Tech; Complete Time: 22:07                                      cp  

                                                                                             

22:00 Order name: IV Saline Lock; Complete Time: 22:20                                        cp  

                                                                                             

22:00 Order name: Labs collected and sent; Complete Time: 22:20                               cp  

                                                                                             

22:00 Order name: O2 Per Protocol; Complete Time: 22:07                                       cp  

                                                                                             

22:00 Order name: O2 Sat Monitoring; Complete Time: 22:07                                     cp  

                                                                                             

22:16 Order name: Urine Dipstick-Ancillary (obtain specimen); Complete Time: 02:05            cp  

                                                                                             

22:16 Order name: Urine Pregnancy Test (obtain specimen); Complete Time: 02:05                cp  

                                                                                             

01:20 Order name: EKG; Complete Time: 01:21                                                   cp  

                                                                                             

01:20 Order name: EKG - Nurse/Tech; Complete Time: 01:39                                      cp  

                                                                                                  

EC/01                                                                                             

22:05 Rate is 109 beats/min. Rhythm is regular. MA interval is normal. QRS interval is        cp  

      normal. QT interval is normal. T waves are Inverted in leads III, aVR. Interpreted by       

      me. Reviewed by me.                                                                         

                                                                                             

01:37 Rate is 140 beats/min. Rhythm is regular. MA interval is normal. QRS interval is        cp  

      normal. QT interval is normal. T waves are Inverted. Interpreted by me. Reviewed by me.     

                                                                                                  

Administered Medications:                                                                         

                                                                                             

22:45 Drug: NS 0.9% 1000 ml Route: IV; Rate: 1 bolus; Site: right antecubital;                [facility] 

                                                                                             

00:00 Follow up: Response: No adverse reaction; IV Status: Completed infusion; IV Intake:     jb4 

      1000ml                                                                                      

                                                                                             

22:45 Drug: Nitro-Bid (nitroglycerin) Ointment 2 % 0.5 inches Route: Transdermal; Site:       [facility] 

      anterior chest wall;                                                                        

22:45 Follow up: Response: Adverse reaction, Physician notified; see chart                    jb4 

22:45 Drug: Nitroglycerin 0.4 mg Route: Sublingual;                                           jb4 

22:45 Follow up: Response: Adverse reaction, Physician notified; see chart                    jb4 

22:50 Drug: Benadryl (diphenhydrAMINE) 50 mg Route: IVP; Site: right antecubital;             jb4 

23:15 Follow up: Response: No adverse reaction; Marked relief of symptoms                     jb4 

23:52 Not Given (not availablee): Methyldopa 500 mg PO once                                     

                                                                                             

00:36 Drug: Doxazosin 4 mg Route: PO;                                                         4 

01:15 Follow up: Response: No adverse reaction; Marked relief of symptoms                     jb4 

01:45 Drug: Lopressor (metoprolol) 5 mg Route: IVP; Site: right antecubital;                  jb4 

02:15 Follow up: Response: No adverse reaction; Marked relief of symptoms                     jb4 

02:04 Drug: Metoprolol TARTRATE 50 mg Route: PO;                                              jb4 

02:30 Follow up: Response: No adverse reaction; Marked relief of symptoms                     jb4 

02:04 Drug: Aspirin 325 mg Route: PO;                                                         jb4 

02:30 Follow up: Response: No adverse reaction                                                [id number] 

                                                                                                  

                                                                                                  

Disposition Summary:                                                                              

22 03:16                                                                                    

Hospitalization Ordered                                                                           

      Hospitalization Status: Observation                                                     cp  

      Provider: Gabino Jo cp  

      Condition: Stable                                                                       cp  

      Problem: new                                                                            cp  

      Symptoms: have improved                                                                 cp  

      Bed/Room Type: Standard                                                                 cp  

      Location: [facility] ER HOLD(22 04:14)                                                  cg  

      Room Assignment: ERHOLD-(22 04:14)                                                cg  

      Diagnosis                                                                                   

        - Chest pain, unspecified                                                             cp  

        - Hypertensive urgency                                                                cp  

      Forms:                                                                                      

        - Medication Reconciliation Form                                                      cp  

        - SBAR form                                                                           cp  

Signatures:                                                                                       

Dispatcher [employer]                           EDVenkata Bray MD MD cha Page, Corey, PA PA   cp                                                   

Dalia Garnica, RN                       RN                                                      

Renee Queen RN                     RN                                                      

Nelson Coronado RN                       RN   [id number]                                                  

                                                                                                  

Corrections: (The following items were deleted from the chart)                                    

                                                                                             

21:38 21:32 PMHx: Hypothyroidism; hb                                                          hb  

                                                                                             

04:14 03:16 Telemetry/MedSurg (observation) cp                                                cg  

04:14 03:16 cp                                                                                cg  

                                                                                                  

**************************************************************************************************

## 2022-09-02 NOTE — EKG
Patient here today for EKG test.     Test Date:    2022-09-01               Test Time:    21:59:06

Technician:   NAS                                    

                                                     

MEASUREMENT RESULTS:                                       

Intervals:                                           

Rate:         109                                    

OK:           150                                    

QRSD:         98                                     

QT:           338                                    

QTc:          455                                    

Axis:                                                

P:            34                                     

OK:           150                                    

QRS:          29                                     

T:            19                                     

                                                     

INTERPRETIVE STATEMENTS:                                       

                                                     

Sinus tachycardia

Nonspecific ST abnormality

Abnormal ECG

No previous ECG available for comparison



Electronically Signed On 09-02-22 07:18:21 CDT by Maverick Mueller

## 2022-09-02 NOTE — EKG
Test Date:    2022-09-02               Test Time:    01:31:47

Technician:   NAS                                    

                                                     

MEASUREMENT RESULTS:                                       

Intervals:                                           

Rate:         140                                    

NY:           124                                    

QRSD:         88                                     

QT:           302                                    

QTc:          461                                    

Axis:                                                

P:            62                                     

NY:           124                                    

QRS:          46                                     

T:            20                                     

                                                     

INTERPRETIVE STATEMENTS:                                       

                                                     

Sinus tachycardia

Cannot rule out Anterior infarct, age undetermined

Abnormal ECG

Compared to ECG 09/01/2022 21:59:06

Myocardial infarct finding now present

ST (T wave) deviation no longer present



Electronically Signed On 09-02-22 07:18:08 CDT by Maverick Mueller

## 2022-09-02 NOTE — ECHO
HEIGHT: 5 ft 5 in   WEIGHT: 225 lb 0 oz   DATE OF STUDY: 9/2/22   REFER DR: Matthew Lopez NP

2-DIMENSIONAL: YES

     M.MODE: YES

 DOPPLER: YES

COLOR FLOW: YES



                    TDS:  YES

PORTABLE: YES

 DEFINITY:  NO

BUBBLE STUDY: NO





DIAGNOSIS:  CHEST PAIN/HYPERTENSION EMERGENCY/ SINUS TACHYCARDIA



CARDIAC HISTORY:  

CATHERIZATION: 

SURGERY: 

PROSTHETIC VALVE: 

PACEMAKER: 





MEASUREMENTS (cm)

    DIASTOLIC (NORMALS)                 SYSTOLIC (NORMALS)

IVSd                 1.2 (0.6-1.2)                    LA Diam 3.3 (1.9-4.0)     LVEF       
  57%  

LVIDd               4.7 (3.5-5.7)                        LVIDs      3.3 (2.0-3.5)     %FS  
        30%

LVPWd             1.3 (0.6-1.2)

Ao Diam           2.6 (2.0-3.7)



2 DIMENSIONAL ASSESSMENT:

RIGHT ATRIUM:                   NORMAL

LEFT ATRIUM:       NORMAL



RIGHT VENTRICLE:            NORMAL

LEFT VENTRICLE: NORMAL



TRICUSPID VALVE:             NORMAL

MITRAL VALVE:     MILD MITRAL REGURGITATION



PULMONIC VALVE:             NORMAL

AORTIC VALVE:     NORMAL



PERICARDIAL EFFUSION: NONE

AORTIC ROOT:      NORMAL





LEFT VENTRICULAR WALL MOTION:     NORMAL.



DOPPLER/COLOR FLOW:     MILD MITRAL REGURGITATION.



COMMENTS:      NORMAL LEFT VENTRICULAR EJECTION FRACTION 55-60%. NORMAL WALL MOTION. MILD 
MITRAL REGURGITATION.



TECHNOLOGIST:   ARUN PAN